# Patient Record
Sex: FEMALE | Race: WHITE | NOT HISPANIC OR LATINO | Employment: UNEMPLOYED | URBAN - METROPOLITAN AREA
[De-identification: names, ages, dates, MRNs, and addresses within clinical notes are randomized per-mention and may not be internally consistent; named-entity substitution may affect disease eponyms.]

---

## 2019-10-14 ENCOUNTER — HOSPITAL ENCOUNTER (EMERGENCY)
Facility: MEDICAL CENTER | Age: 59
End: 2019-10-14
Attending: EMERGENCY MEDICINE
Payer: MEDICAID

## 2019-10-14 VITALS
HEIGHT: 64 IN | BODY MASS INDEX: 35 KG/M2 | HEART RATE: 93 BPM | OXYGEN SATURATION: 94 % | SYSTOLIC BLOOD PRESSURE: 148 MMHG | TEMPERATURE: 98.2 F | RESPIRATION RATE: 18 BRPM | WEIGHT: 205.03 LBS | DIASTOLIC BLOOD PRESSURE: 105 MMHG

## 2019-10-14 DIAGNOSIS — Z76.0 MEDICATION REFILL: ICD-10-CM

## 2019-10-14 PROCEDURE — 99282 EMERGENCY DEPT VISIT SF MDM: CPT

## 2019-10-14 RX ORDER — GABAPENTIN 300 MG/1
300 CAPSULE ORAL 3 TIMES DAILY
Status: SHIPPED | COMMUNITY
End: 2019-10-14 | Stop reason: SDUPTHER

## 2019-10-14 RX ORDER — ALBUTEROL SULFATE 90 UG/1
2 AEROSOL, METERED RESPIRATORY (INHALATION) EVERY 6 HOURS PRN
Qty: 8.5 G | Refills: 1 | Status: SHIPPED | OUTPATIENT
Start: 2019-10-14 | End: 2020-02-16 | Stop reason: SDUPTHER

## 2019-10-14 RX ORDER — AMLODIPINE BESYLATE 5 MG/1
5 TABLET ORAL EVERY MORNING
Status: SHIPPED | COMMUNITY
End: 2019-10-14 | Stop reason: SDUPTHER

## 2019-10-14 RX ORDER — MONTELUKAST SODIUM 10 MG/1
10 TABLET ORAL DAILY
Qty: 30 TAB | Refills: 0 | Status: SHIPPED | OUTPATIENT
Start: 2019-10-14

## 2019-10-14 RX ORDER — OLANZAPINE 15 MG/1
15 TABLET ORAL NIGHTLY
Status: SHIPPED | COMMUNITY
End: 2019-10-14 | Stop reason: SDUPTHER

## 2019-10-14 RX ORDER — LITHIUM CARBONATE 300 MG/1
300 TABLET, FILM COATED, EXTENDED RELEASE ORAL EVERY EVENING
Qty: 30 TAB | Refills: 0 | Status: SHIPPED | OUTPATIENT
Start: 2019-10-14 | End: 2020-03-19

## 2019-10-14 RX ORDER — PANTOPRAZOLE SODIUM 20 MG/1
20 TABLET, DELAYED RELEASE ORAL DAILY
Status: SHIPPED | COMMUNITY
End: 2019-10-14

## 2019-10-14 RX ORDER — HYDROXYZINE 50 MG/1
50 TABLET, FILM COATED ORAL 3 TIMES DAILY
Status: SHIPPED | COMMUNITY
End: 2019-10-14 | Stop reason: SDUPTHER

## 2019-10-14 RX ORDER — VILAZODONE HYDROCHLORIDE 20 MG/1
20 TABLET ORAL EVERY MORNING
Qty: 7 TAB | Refills: 0 | Status: SHIPPED | OUTPATIENT
Start: 2019-10-14 | End: 2021-12-27

## 2019-10-14 RX ORDER — ALBUTEROL SULFATE 90 UG/1
2 AEROSOL, METERED RESPIRATORY (INHALATION) EVERY 6 HOURS PRN
Status: SHIPPED | COMMUNITY
End: 2019-10-14 | Stop reason: SDUPTHER

## 2019-10-14 RX ORDER — OMEPRAZOLE 10 MG/1
10 CAPSULE, DELAYED RELEASE ORAL EVERY MORNING
Qty: 30 CAP | Refills: 0 | Status: SHIPPED | OUTPATIENT
Start: 2019-10-14

## 2019-10-14 RX ORDER — LEVETIRACETAM 500 MG/1
500 TABLET ORAL 2 TIMES DAILY
Status: SHIPPED | COMMUNITY
End: 2019-10-14 | Stop reason: SDUPTHER

## 2019-10-14 RX ORDER — LITHIUM CARBONATE 300 MG/1
300 TABLET, FILM COATED, EXTENDED RELEASE ORAL EVERY EVENING
Status: SHIPPED | COMMUNITY
End: 2019-10-14 | Stop reason: SDUPTHER

## 2019-10-14 RX ORDER — RANITIDINE 150 MG/1
150 TABLET ORAL DAILY
Status: SHIPPED | COMMUNITY
End: 2019-10-14

## 2019-10-14 RX ORDER — HYDROXYZINE 50 MG/1
50 TABLET, FILM COATED ORAL 3 TIMES DAILY
Qty: 90 TAB | Refills: 0 | Status: SHIPPED | OUTPATIENT
Start: 2019-10-14

## 2019-10-14 RX ORDER — OLANZAPINE 15 MG/1
15 TABLET ORAL
Qty: 30 TAB | Refills: 0 | Status: SHIPPED | OUTPATIENT
Start: 2019-10-14 | End: 2021-12-06

## 2019-10-14 RX ORDER — MONTELUKAST SODIUM 10 MG/1
10 TABLET ORAL DAILY
Status: SHIPPED | COMMUNITY
End: 2019-10-14 | Stop reason: SDUPTHER

## 2019-10-14 RX ORDER — VILAZODONE HYDROCHLORIDE 20 MG/1
20 TABLET ORAL EVERY MORNING
Status: SHIPPED | COMMUNITY
End: 2019-10-14 | Stop reason: SDUPTHER

## 2019-10-14 RX ORDER — GABAPENTIN 300 MG/1
300 CAPSULE ORAL 3 TIMES DAILY
Qty: 90 CAP | Refills: 0 | Status: SHIPPED | OUTPATIENT
Start: 2019-10-14

## 2019-10-14 RX ORDER — BETAMETHASONE DIPROPIONATE 0.5 MG/G
1 CREAM TOPICAL 2 TIMES DAILY
Status: SHIPPED | COMMUNITY
End: 2019-10-14 | Stop reason: SDUPTHER

## 2019-10-14 RX ORDER — LEVETIRACETAM 500 MG/1
500 TABLET ORAL 2 TIMES DAILY
Qty: 60 TAB | Refills: 0 | Status: SHIPPED | OUTPATIENT
Start: 2019-10-14 | End: 2020-03-19

## 2019-10-14 RX ORDER — OMEPRAZOLE 10 MG/1
10 CAPSULE, DELAYED RELEASE ORAL EVERY MORNING
Status: SHIPPED | COMMUNITY
End: 2019-10-14 | Stop reason: SDUPTHER

## 2019-10-14 RX ORDER — BETAMETHASONE DIPROPIONATE 0.5 MG/G
1 CREAM TOPICAL 2 TIMES DAILY
Qty: 1 TUBE | Refills: 0 | Status: SHIPPED | OUTPATIENT
Start: 2019-10-14

## 2019-10-14 RX ORDER — AMLODIPINE BESYLATE 5 MG/1
5 TABLET ORAL EVERY MORNING
Qty: 30 TAB | Refills: 0 | Status: SHIPPED | OUTPATIENT
Start: 2019-10-14 | End: 2020-03-19

## 2019-10-14 SDOH — HEALTH STABILITY: MENTAL HEALTH: HOW OFTEN DO YOU HAVE A DRINK CONTAINING ALCOHOL?: NEVER

## 2019-10-14 ASSESSMENT — PAIN SCALES - WONG BAKER: WONGBAKER_NUMERICALRESPONSE: DOESN'T HURT AT ALL

## 2019-10-14 NOTE — ED NOTES
Med Rec complete per Pt, Pt's son and RX packaging at bedside  Allergies Reviewed  No ABX in the last 14 days    Pt has been out of most of her medications for > 1 week.    Pt is taking RANITIDINE, PRILOSEC, and PROTONIX

## 2019-10-14 NOTE — ED NOTES
Patient recently intermediate care is being transferred to son so he brought her to [place] so he can care for her. She was hospitalized for confusion and was started on Lithium which seems to make it worse. She needs her medications until she can establish with new  Doctors.

## 2019-10-14 NOTE — ED TRIAGE NOTES
"Chief Complaint   Patient presents with   • Medication Refill     pt is from [place]. pt has been out of her medications for 2 weeks. pt has an appoinment with estabalished PCP on November 6th. pt has 18 meds to refil.      /105   Pulse 93   Temp 36.8 °C (98.2 °F) (Temporal)   Resp 18   Ht 1.626 m (5' 4\")   Wt 93 kg (205 lb 0.4 oz)   SpO2 94%   BMI 35.19 kg/m²     "

## 2019-10-14 NOTE — ED PROVIDER NOTES
ED Provider Note    CHIEF COMPLAINT  Chief Complaint   Patient presents with   • Medication Refill     pt is from [place]. pt has been out of her medications for 2 weeks. pt has an appoinment with estabalished PCP on November 6th. pt has 18 meds to refil.        HPI  Aubree Cantu is a 59 y.o. female who presents asking for refill on all of her medication.  The patient was taken out of a care home by her son and she is currently living with him in [place].  She just came from [place] and had no way to get all of her prescriptions.  The patient is asking for refill for the next month until she can establish with a primary care provider.  She presents in an asymptomatic state.    REVIEW OF SYSTEMS  See HPI for further details. All other systems are negative.     PAST MEDICAL HISTORY  Past Medical History:   Diagnosis Date   • Asthma    • Cancer (HCC)     skin cancer   • Chronic obstructive pulmonary disease (HCC)    • Hypertension    • Psychiatric disorder     PTSD, Bipolar, depression, anxiety       FAMILY HISTORY  [unfilled]    SOCIAL HISTORY  Social History     Socioeconomic History   • Marital status:      Spouse name: Not on file   • Number of children: Not on file   • Years of education: Not on file   • Highest education level: Not on file   Occupational History   • Not on file   Social Needs   • Financial resource strain: Not on file   • Food insecurity:     Worry: Not on file     Inability: Not on file   • Transportation needs:     Medical: Not on file     Non-medical: Not on file   Tobacco Use   • Smoking status: Never Smoker   • Smokeless tobacco: Never Used   Substance and Sexual Activity   • Alcohol use: Never     Frequency: Never   • Drug use: Never   • Sexual activity: Not on file   Lifestyle   • Physical activity:     Days per week: Not on file     Minutes per session: Not on file   • Stress: Not on file   Relationships   • Social connections:     Talks on phone: Not on file     Gets together: Not  "on file     Attends [employer] service: Not on file     Active member of club or organization: Not on file     Attends meetings of clubs or organizations: Not on file     Relationship status: Not on file   • Intimate partner violence:     Fear of current or ex partner: Not on file     Emotionally abused: Not on file     Physically abused: Not on file     Forced sexual activity: Not on file   Other Topics Concern   • Not on file   Social History Narrative   • Not on file       SURGICAL HISTORY  Past Surgical History:   Procedure Laterality Date   • OTHER      Parathyroidectomy       CURRENT MEDICATIONS  Home Medications     Reviewed by Charles Miller (Pharmacy Tech) on 10/14/19 at 1345  Med List Status: Complete   Medication Last Dose Status   albuterol 108 (90 Base) MCG/ACT Aero Soln inhalation aerosol > 1 week Active   amLODIPine (NORVASC) 5 MG Tab > 1 week Active   betamethasone dipropionate (DIPROLENE) 0.05 % Cream 10/14/2019 Active   gabapentin (NEURONTIN) 300 MG Cap 10/14/2019 Active   hydrOXYzine HCl (ATARAX) 50 MG Tab > 1 week Active   levETIRAcetam (KEPPRA) 500 MG Tab > 1 week Active   lithium CR (LITHOBID) 300 MG Tab CR > 1 week Active   montelukast (SINGULAIR) 10 MG Tab > 1 week Active   olanzapine (ZYPREXA) 15 MG tablet > 1 week Active   omeprazole (PRILOSEC) 10 MG CAPSULE DELAYED RELEASE 10/14/2019 Active   pantoprazole (PROTONIX) 20 MG tablet > 1 week Active   raNITidine (ZANTAC) 150 MG Tab > 1 week Active   Vilazodone HCl (VIIBRYD) 20 MG Tab > 1 week Active                ALLERGIES  Allergies   Allergen Reactions   • Xanax [Alprazolam Xr]      Increased anxiety       PHYSICAL EXAM  VITAL SIGNS: /105   Pulse 93   Temp 36.8 °C (98.2 °F) (Temporal)   Resp 18   Ht 1.626 m (5' 4\")   Wt 93 kg (205 lb 0.4 oz)   SpO2 94%   BMI 35.19 kg/m²       Constitutional: Well developed, Well nourished, No acute distress, Non-toxic appearance.   HENT: Normocephalic, Atraumatic, Bilateral external ears " normal, Oropharynx moist, No oral exudates, Nose normal.   Eyes: PERRLA, EOMI, Conjunctiva normal, No discharge.   Neck: Normal range of motion, No tenderness, Supple, No stridor.   Lymphatic: No lymphadenopathy noted.   Cardiovascular: Normal heart rate, Normal rhythm, No murmurs, No rubs, No gallops.   Thorax & Lungs: Normal breath sounds, No respiratory distress, No wheezing, No chest tenderness.   Abdomen: Bowel sounds normal, Soft, No tenderness, No masses, No pulsatile masses.   Skin: Warm, Dry, No erythema, No rash.   Back: No tenderness, No CVA tenderness.   Extremities: Intact distal pulses, No edema, No tenderness, No cyanosis, No clubbing.   Musculoskeletal: Good range of motion in all major joints. No tenderness to palpation or major deformities noted.   Neurologic: Alert & oriented x 3, Normal motor function, Normal sensory function, No focal deficits noted.   Psychiatric: Affect normal, Judgment normal, Mood normal.     COURSE & MEDICAL DECISION MAKING  Pertinent Labs & Imaging studies reviewed. (See chart for details)  This is a 59-year-old female who presents the emerge department in an asymptomatic state asking for a refill on her medication.  This will be accomplished for the next month.  She will follow-up with her primary care provider and return for any symptoms.    FINAL IMPRESSION  1.  Medication refill         Electronically signed by: Edison Telles, 10/14/2019 1:59 PM

## 2020-02-16 ENCOUNTER — APPOINTMENT (OUTPATIENT)
Dept: RADIOLOGY | Facility: MEDICAL CENTER | Age: 60
End: 2020-02-16
Attending: EMERGENCY MEDICINE
Payer: MEDICAID

## 2020-02-16 ENCOUNTER — HOSPITAL ENCOUNTER (EMERGENCY)
Facility: MEDICAL CENTER | Age: 60
End: 2020-02-16
Attending: EMERGENCY MEDICINE
Payer: MEDICAID

## 2020-02-16 VITALS
DIASTOLIC BLOOD PRESSURE: 65 MMHG | OXYGEN SATURATION: 93 % | WEIGHT: 196.21 LBS | HEIGHT: 64 IN | SYSTOLIC BLOOD PRESSURE: 124 MMHG | RESPIRATION RATE: 18 BRPM | HEART RATE: 75 BPM | BODY MASS INDEX: 33.5 KG/M2 | TEMPERATURE: 97.2 F

## 2020-02-16 DIAGNOSIS — R05.9 COUGH: ICD-10-CM

## 2020-02-16 PROCEDURE — 99284 EMERGENCY DEPT VISIT MOD MDM: CPT

## 2020-02-16 PROCEDURE — A9270 NON-COVERED ITEM OR SERVICE: HCPCS | Performed by: EMERGENCY MEDICINE

## 2020-02-16 PROCEDURE — 700111 HCHG RX REV CODE 636 W/ 250 OVERRIDE (IP): Performed by: EMERGENCY MEDICINE

## 2020-02-16 PROCEDURE — 71046 X-RAY EXAM CHEST 2 VIEWS: CPT

## 2020-02-16 PROCEDURE — 700102 HCHG RX REV CODE 250 W/ 637 OVERRIDE(OP): Performed by: EMERGENCY MEDICINE

## 2020-02-16 RX ORDER — BENZONATATE 100 MG/1
100 CAPSULE ORAL 3 TIMES DAILY PRN
Qty: 20 CAP | Refills: 0 | Status: SHIPPED | OUTPATIENT
Start: 2020-02-16

## 2020-02-16 RX ORDER — BENZONATATE 100 MG/1
100 CAPSULE ORAL ONCE
Status: COMPLETED | OUTPATIENT
Start: 2020-02-16 | End: 2020-02-16

## 2020-02-16 RX ORDER — ALBUTEROL SULFATE 90 UG/1
2 AEROSOL, METERED RESPIRATORY (INHALATION) EVERY 6 HOURS PRN
Qty: 8.5 G | Refills: 1 | Status: SHIPPED | OUTPATIENT
Start: 2020-02-16

## 2020-02-16 RX ORDER — PREDNISONE 20 MG/1
20 TABLET ORAL DAILY
Qty: 4 TAB | Refills: 0 | Status: SHIPPED | OUTPATIENT
Start: 2020-02-16 | End: 2020-02-20

## 2020-02-16 RX ORDER — PREDNISONE 10 MG/1
20 TABLET ORAL ONCE
Status: COMPLETED | OUTPATIENT
Start: 2020-02-16 | End: 2020-02-16

## 2020-02-16 RX ADMIN — BENZONATATE 100 MG: 100 CAPSULE ORAL at 13:26

## 2020-02-16 RX ADMIN — PREDNISONE 20 MG: 10 TABLET ORAL at 13:25

## 2020-02-16 NOTE — ED TRIAGE NOTES
"Presents complaining of a productive cough, congestion and chills recurring for the past 3 to 4 days with green phlegm production.   Chief Complaint   Patient presents with   • Cough     /77   Pulse 74   Temp 36.2 °C (97.2 °F) (Temporal)   Resp 16   Ht 1.626 m (5' 4\")   Wt 89 kg (196 lb 3.4 oz)   SpO2 93%   BMI 33.68 kg/m²     "

## 2020-02-16 NOTE — DISCHARGE INSTRUCTIONS
Please follow-up with your primary care physician for complete recheck.  Return to the emergency department if you develop any new or worsening symptoms, this includes worsening cough, congestion, shortness of breath, or any further concerns.  If your symptoms have not improved in 2 to 3 days, please return to the emergency department if you are unable to follow-up with primary care for breathing recheck.

## 2020-02-16 NOTE — ED PROVIDER NOTES
"ED Provider Note    Chief Complaint:   Cough    HPI:  Aubree Cantu is a 60 y.o. female who presents with chief complaint of cough for 1 week.  She reports a dry persistent cough, neither worsening nor improving.  She attended a family event last week and developed a cough at that time.  She presents to the emergency department accompanied by her son and daughter-in-law who have similar symptoms.  She denies any associated fevers, she has had no nausea, no vomiting, no sore throat.  She has no chest pain.  She denies any associated shortness of breath except when actively coughing.    She does have a history of COPD, as well as a history of asthma.  She has been using her albuterol rescue inhaler little more frequently than usual, and is requesting refill today.    Review of Systems:  See HPI for pertinent positives and negatives. All other systems negative.    Past Medical History:   has a past medical history of Asthma, Cancer (HCC), Chronic obstructive pulmonary disease (HCC), Hypertension, and Psychiatric disorder.    Social History:  Social History     Tobacco Use   • Smoking status: Never Smoker   • Smokeless tobacco: Never Used   Substance and Sexual Activity   • Alcohol use: Never     Frequency: Never   • Drug use: Never   • Sexual activity: Not on file       Surgical History:   has a past surgical history that includes other.    Current Medications:  Home Medications    **Home medications have not yet been reviewed for this encounter**         Allergies:  Allergies   Allergen Reactions   • Xanax [Alprazolam Xr]      Increased anxiety       Physical Exam:  Vital Signs: /65   Pulse 75   Temp 36.2 °C (97.2 °F) (Temporal)   Resp 18   Ht 1.626 m (5' 4\")   Wt 89 kg (196 lb 3.4 oz)   SpO2 93%   BMI 33.68 kg/m²   Constitutional: Alert, no acute distress  HENT: Moist mucus membranes, normal posterior pharynx, no intraoral lesions  Eyes: Normal conjunctiva  Neck: Supple, normal range of " motion  Cardiovascular: Extremities are warm and well perfused, no murmur appreciated, normal cardiac auscultation  Pulmonary: No respiratory distress, normal work of breathing, no accessory muscule usage, breath sounds clear and equal bilaterally, no wheezing, no coarse breath sounds  Psychiatric: Normal and appropriate mood and affect    Medical records reviewed for continuity of care.  No recent visits for similar symptoms.  Patient had been taken out of a care facility by her son in October 2019, was brought to the emergency department for all medication refills.  She does have a history of COPD and asthma.    MDM:  Patient presents with dry intermittent cough for the past week.  She has a negative chest x-ray.  Due to her history of COPD and asthma as well as increased inhaler use I did give her a short course of prednisone.  She has no wheezing and no hypoxia on my exam, do not believe she requires emergent nebulizer treatment.  I did refill her inhaler at her request as well.  She has no symptoms of influenza at this time, she came to the emergency department with her son and daughter-in-law both of whom tested positive for influenza.  Suspect this may be residual cough that she recovers from a mild case of flu.  If so, she is outside of the window for Tamiflu treatment.  Counseled her on supportive care.  She is discharged home in stable condition.  Counseled to follow-up with her primary care physician within 1 week for breathing recheck.  Return precautions were discussed with the patient, and provided in written form with the patient's discharge instructions.     Blood pressure today is greater than 120/80, patient is instructed to follow up with primary care provider for blood pressure recheck.    Disposition:  Discharge home in stable condition    Final Impression:  1. Cough        Electronically signed by: Keisha Varela MD, 2/16/2020 7:03 PM

## 2020-02-16 NOTE — ED NOTES
D/c pt home, 3 rx given . Pt aware of f/u instructions , aware to return for any changes or concerns. No further questions upon d/c home from ed

## 2020-03-03 ENCOUNTER — HOSPITAL ENCOUNTER (EMERGENCY)
Facility: MEDICAL CENTER | Age: 60
End: 2020-03-03
Attending: EMERGENCY MEDICINE
Payer: MEDICAID

## 2020-03-03 ENCOUNTER — APPOINTMENT (OUTPATIENT)
Dept: RADIOLOGY | Facility: MEDICAL CENTER | Age: 60
End: 2020-03-03
Attending: EMERGENCY MEDICINE
Payer: MEDICAID

## 2020-03-03 VITALS
TEMPERATURE: 98.6 F | OXYGEN SATURATION: 95 % | HEIGHT: 64 IN | DIASTOLIC BLOOD PRESSURE: 90 MMHG | SYSTOLIC BLOOD PRESSURE: 120 MMHG | RESPIRATION RATE: 16 BRPM | HEART RATE: 90 BPM | BODY MASS INDEX: 33.8 KG/M2 | WEIGHT: 197.97 LBS

## 2020-03-03 DIAGNOSIS — S39.012A STRAIN OF LUMBAR REGION, INITIAL ENCOUNTER: ICD-10-CM

## 2020-03-03 DIAGNOSIS — S93.401A SPRAIN OF RIGHT ANKLE, UNSPECIFIED LIGAMENT, INITIAL ENCOUNTER: ICD-10-CM

## 2020-03-03 DIAGNOSIS — S86.012A STRAIN OF LEFT ACHILLES TENDON, INITIAL ENCOUNTER: ICD-10-CM

## 2020-03-03 DIAGNOSIS — G44.319 ACUTE POST-TRAUMATIC HEADACHE, NOT INTRACTABLE: ICD-10-CM
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
## 2020-03-03 PROCEDURE — 70450 CT HEAD/BRAIN W/O DYE: CPT

## 2020-03-03 PROCEDURE — 99284 EMERGENCY DEPT VISIT MOD MDM: CPT

## 2020-03-03 PROCEDURE — 700102 HCHG RX REV CODE 250 W/ 637 OVERRIDE(OP): Performed by: EMERGENCY MEDICINE

## 2020-03-03 PROCEDURE — A9270 NON-COVERED ITEM OR SERVICE: HCPCS | Performed by: EMERGENCY MEDICINE

## 2020-03-03 RX ORDER — IBUPROFEN 600 MG/1
600 TABLET ORAL EVERY 6 HOURS PRN
Qty: 20 TAB | Refills: 0 | Status: SHIPPED | OUTPATIENT
Start: 2020-03-03

## 2020-03-03 RX ORDER — CLONAZEPAM 1 MG/1
1 TABLET ORAL ONCE
Status: COMPLETED | OUTPATIENT
Start: 2020-03-03 | End: 2020-03-03

## 2020-03-03 RX ADMIN — CLONAZEPAM 1 MG: 1 TABLET ORAL at 11:23

## 2020-03-03 ASSESSMENT — LIFESTYLE VARIABLES: DO YOU DRINK ALCOHOL: NO

## 2020-03-03 NOTE — ED PROVIDER NOTES
ED Provider Note    CHIEF COMPLAINT  Chief Complaint   Patient presents with   • T-5000 GLF   • Low Back Pain   • Foot Pain       HPI  Aubree Cantu is a 60 y.o. female who presents complaining of progressive headache, midline radiating all sides of her head suffered after fall striking her head on the concrete yesterday.  She states she was not paying attention and tripped over a curb.  She has had some soreness to both ankles, left forearm, left lower back.  No abdominal pain, no chest pain, no shortness of breath.  She denies taking blood thinners.  No loss of consciousness.  No acute numbness or weakness.  Patient states she has fibromyalgia in remission.  She states she has been anxious since her sister was murdered with a hammer last month.  No suicidal or homicidal ideation    REVIEW OF SYSTEMS  Ear nose throat: No facial pain  Respiratory: No shortness of breath or pleurisy  Gastrointestinal: No nausea or vomiting  Musculoskeletal: Bilateral ankle, left wrist, low back pain.  Patient denies neck pain  Neurologic: Headache  Skin: No laceration     All other systems are negative.       PAST MEDICAL HISTORY  Past Medical History:   Diagnosis Date   • Asthma    • Cancer (HCC)     skin cancer   • Chronic obstructive pulmonary disease (HCC)    • Hypertension    • Psychiatric disorder     PTSD, Bipolar, depression, anxiety       FAMILY HISTORY  History reviewed. No pertinent family history.    SOCIAL HISTORY  Social History     Socioeconomic History   • Marital status:      Spouse name: Not on file   • Number of children: Not on file   • Years of education: Not on file   • Highest education level: Not on file   Occupational History   • Not on file   Social Needs   • Financial resource strain: Not on file   • Food insecurity     Worry: Not on file     Inability: Not on file   • Transportation needs     Medical: Not on file     Non-medical: Not on file   Tobacco Use   • Smoking status: Never Smoker   •  Smokeless tobacco: Never Used   Substance and Sexual Activity   • Alcohol use: Never     Frequency: Never   • Drug use: Never   • Sexual activity: Not on file   Lifestyle   • Physical activity     Days per week: Not on file     Minutes per session: Not on file   • Stress: Not on file   Relationships   • Social connections     Talks on phone: Not on file     Gets together: Not on file     Attends [employer] service: Not on file     Active member of club or organization: Not on file     Attends meetings of clubs or organizations: Not on file     Relationship status: Not on file   • Intimate partner violence     Fear of current or ex partner: Not on file     Emotionally abused: Not on file     Physically abused: Not on file     Forced sexual activity: Not on file   Other Topics Concern   • Not on file   Social History Narrative   • Not on file       SURGICAL HISTORY  Past Surgical History:   Procedure Laterality Date   • OTHER      Parathyroidectomy       CURRENT MEDICATIONS  No current facility-administered medications on file prior to encounter.      Current Outpatient Medications on File Prior to Encounter   Medication Sig Dispense Refill   • albuterol 108 (90 Base) MCG/ACT Aero Soln inhalation aerosol Inhale 2 Puffs by mouth every 6 hours as needed for Shortness of Breath. 8.5 g 1   • benzonatate (TESSALON) 100 MG Cap Take 1 Cap by mouth 3 times a day as needed for Cough. 20 Cap 0   • amLODIPine (NORVASC) 5 MG Tab Take 1 Tab by mouth every morning. 30 Tab 0   • betamethasone dipropionate (DIPROLENE) 0.05 % Cream Apply 1 Application to affected area(s) 2 times a day. Apply to legs and feet 1 Tube 0   • gabapentin (NEURONTIN) 300 MG Cap Take 1 Cap by mouth 3 times a day. 90 Cap 0   • hydrOXYzine HCl (ATARAX) 50 MG Tab Take 1 Tab by mouth 3 times a day. Indications: Feeling Anxious 90 Tab 0   • levETIRAcetam (KEPPRA) 500 MG Tab Take 1 Tab by mouth 2 times a day. 60 Tab 0   • lithium CR (LITHOBID) 300 MG Tab CR Take 1  "Tab by mouth every evening. 30 Tab 0   • montelukast (SINGULAIR) 10 MG Tab Take 1 Tab by mouth every day. 30 Tab 0   • olanzapine (ZYPREXA) 15 MG tablet Take 1 Tab by mouth every bedtime. 30 Tab 0   • omeprazole (PRILOSEC) 10 MG CAPSULE DELAYED RELEASE Take 1 Cap by mouth every morning. 30 Cap 0   • Vilazodone HCl (VIIBRYD) 20 MG Tab Take 20 mg by mouth every morning. 7 Tab 0       ALLERGIES  Allergies   Allergen Reactions   • Xanax [Alprazolam Xr]      Increased anxiety       PHYSICAL EXAM  VITAL SIGNS: /101   Pulse 79   Temp 37 °C (98.6 °F) (Temporal)   Resp 16   Ht 1.626 m (5' 4\")   Wt 89.8 kg (197 lb 15.6 oz)   SpO2 96%   BMI 33.98 kg/m²    Constitutional: Well-nourished, no distress  HENT: No facial trauma, facial bones are nontender  Eyes: Pupils are equal 3 millimeters, Conjunctiva normal, No discharge.   Neck: Nontender, range of motion without pain or stiffness  Cardiovascular: Normal heart rate, Normal rhythm   Pulmonary: Equal  breath sounds, No wheezing or rales.  Normal air movement  GI: Abdomen is soft and nontender, no guarding  Skin: No abrasion, no bruising, no laceration  Vascular: Normal capillary refill all extremities  Musculoskeletal: Midline spine is nontender.  There is left paraspinal musculature tenderness in the lumbar area.  The pelvis is nontender.  Lateral malleolus of the right ankle is tender without deformity or swelling.  No crepitance.  Left ankle nontender, there is mild tenderness at the distal Achilles tendon.  Musculature of the left forearm is tender to the dorsal aspect, no arthralgia in the area.  No bony tenderness  Neurologic: Sensation and strength normal.  Speech clear  Psychiatric: Some anxiety    RADIOLOGY/PROCEDURES  CT-HEAD W/O   Final Result      1.  Cerebral atrophy.      2.  Otherwise, Head CT without contrast within normal limits. No evidence of acute cerebral infarction, hemorrhage or mass lesion.                COURSE & MEDICAL DECISION " MAKING  Pertinent Labs & Imaging studies reviewed. (See chart for details)  Patient requesting prescription for Clonopin, medication she is taken in the past but is currently not on.  Patient cites anxiety over her sister's recent murder.  I am agreeable to giving her dose in the ER however she will need consultation with her primary doctor or psychiatrist prior to prescription and has been advised to follow-up with these 2 doctors of hers.  Patient does not require x-rays of extremity injuries at this time, appear to be muscular strain or local contusion as opposed to fracture.  She is advised she will need reevaluation of these areas in 2 weeks if not better.  CT scan of the head is obtained secondary to progressive headache the day after striking her head on concrete.      Head CT scan negative, no evidence of skull fracture or bleed.  Patient has agreed to follow-up with  in 1 to 2 weeks if other areas of injury have not improved.  She is discharged stable condition    FINAL IMPRESSION     1. Fall, initial encounter     2. Strain of lumbar region, initial encounter     3. Sprain of right ankle, unspecified ligament, initial encounter     4. Strain of left Achilles tendon, initial encounter     5. Acute post-traumatic headache, not intractable               Electronically signed by: Phil Cummings M.D., 3/3/2020

## 2020-03-07 ENCOUNTER — APPOINTMENT (OUTPATIENT)
Dept: RADIOLOGY | Facility: MEDICAL CENTER | Age: 60
End: 2020-03-07
Attending: EMERGENCY MEDICINE
Payer: MEDICAID

## 2020-03-07 ENCOUNTER — HOSPITAL ENCOUNTER (EMERGENCY)
Facility: MEDICAL CENTER | Age: 60
End: 2020-03-07
Attending: EMERGENCY MEDICINE
Payer: MEDICAID

## 2020-03-07 VITALS
WEIGHT: 204.15 LBS | HEIGHT: 65 IN | TEMPERATURE: 96.6 F | DIASTOLIC BLOOD PRESSURE: 89 MMHG | RESPIRATION RATE: 14 BRPM | HEART RATE: 70 BPM | BODY MASS INDEX: 34.01 KG/M2 | OXYGEN SATURATION: 96 % | SYSTOLIC BLOOD PRESSURE: 148 MMHG

## 2020-03-07 DIAGNOSIS — M25.572 ACUTE BILATERAL ANKLE PAIN: ICD-10-CM

## 2020-03-07 DIAGNOSIS — M54.6 ACUTE BILATERAL THORACIC BACK PAIN: ICD-10-CM

## 2020-03-07 DIAGNOSIS — M25.571 ACUTE BILATERAL ANKLE PAIN: ICD-10-CM

## 2020-03-07 PROCEDURE — 73610 X-RAY EXAM OF ANKLE: CPT | Mod: RT

## 2020-03-07 PROCEDURE — 73610 X-RAY EXAM OF ANKLE: CPT | Mod: LT

## 2020-03-07 PROCEDURE — 72070 X-RAY EXAM THORAC SPINE 2VWS: CPT

## 2020-03-07 PROCEDURE — 99283 EMERGENCY DEPT VISIT LOW MDM: CPT

## 2020-03-07 ASSESSMENT — LIFESTYLE VARIABLES
DOES PATIENT WANT TO STOP DRINKING: NO
DO YOU DRINK ALCOHOL: NO

## 2020-03-07 NOTE — ED NOTES
RN educated Pt on pain control and return precautions. Pt verbalized understanding. Ambulated out of ED independently.

## 2020-03-07 NOTE — ED PROVIDER NOTES
"ED Provider Note    CHIEF COMPLAINT  Chief Complaint   Patient presents with   • Ankle Injury     Patient states \"a little over a week ago\" she stumbled over a curb and hurt Bilateral ankles, no trauma noted, bilateral CMS intact, denies numbness and tingling   • Back Pain     Started after \"stumble\" she has upper and lower back pain. able to void and pass stool normally.        HPI  Aubree Cantu is a 60 y.o. female who ambulates to the emergency department through triage for ankle pain, back pain.  Patient states symptomatology stems from a mechanical ground-level trip and fall over a curb last week.  Patient was seen here initially, but states she did not have these injuries evaluated at that time.  She describes some mild intermittent pain in bilateral ankles, and her mid back.  No paresthesias.  No focal weakness.  No bowel or bladder changes.  No lower extremity swelling, discoloration.  Patient ambulates without difficulty.    REVIEW OF SYSTEMS  See HPI for further details. All other systems are negative.     PAST MEDICAL HISTORY   has a past medical history of Asthma, Cancer (HCC), Chronic obstructive pulmonary disease (HCC), Hypertension, and Psychiatric disorder.    SOCIAL HISTORY  Social History     Tobacco Use   • Smoking status: Never Smoker   • Smokeless tobacco: Never Used   Substance and Sexual Activity   • Alcohol use: Never     Frequency: Never   • Drug use: Never   • Sexual activity: Not on file       SURGICAL HISTORY   has a past surgical history that includes other.    CURRENT MEDICATIONS  Home Medications     Reviewed by Phil Bhagat R.N. (Registered Nurse) on 03/07/20 at 0439  Med List Status: <None>   Medication Last Dose Status   albuterol 108 (90 Base) MCG/ACT Aero Soln inhalation aerosol  Active   amLODIPine (NORVASC) 5 MG Tab  Active   benzonatate (TESSALON) 100 MG Cap  Active   betamethasone dipropionate (DIPROLENE) 0.05 % Cream  Active   gabapentin (NEURONTIN) " "300 MG Cap  Active   hydrOXYzine HCl (ATARAX) 50 MG Tab  Active   ibuprofen (MOTRIN) 600 MG Tab  Active   levETIRAcetam (KEPPRA) 500 MG Tab  Active   lithium CR (LITHOBID) 300 MG Tab CR  Active   montelukast (SINGULAIR) 10 MG Tab  Active   olanzapine (ZYPREXA) 15 MG tablet  Active   omeprazole (PRILOSEC) 10 MG CAPSULE DELAYED RELEASE  Active   Vilazodone HCl (VIIBRYD) 20 MG Tab  Active                ALLERGIES  Allergies   Allergen Reactions   • Xanax [Alprazolam Xr]      Increased anxiety       PHYSICAL EXAM  VITAL SIGNS: /89   Pulse 70   Temp 35.9 °C (96.6 °F) (Temporal)   Resp 14   Ht 1.638 m (5' 4.5\")   Wt 92.6 kg (204 lb 2.3 oz)   LMP  (LMP Unknown)   SpO2 96%   BMI 34.50 kg/m²   Pulse ox interpretation: I interpret this pulse ox as normal.  Constitutional: Alert in no apparent distress.  HENT: Normocephalic, atraumatic. Bilateral external ears normal, Nose normal. Moist mucous membranes.    Eyes: Pupils are equal and reactive, Conjunctiva normal.   Neck: Normal range of motion  Cardiovascular: Normal peripheral perfusion.  Thorax & Lungs: Nonlabored respirations.  Skin: Warm, Dry  Musculoskeletal: Bilateral knees, lower legs and ankles appear quite unremarkable, no swelling, discoloration, crepitus.  Patient describes some mild discomfort with palpation bilateral lateral and medial malleoli.  Range of motion appears intact otherwise.  2+ DP, sensation intact light touch distally.  Patient bears weight and ambulates briskly without difficulty.  Pelvis stable.  She does also have some diffuse thoracic midline and paravertebral pain without cutaneous changes, step-off, abrasion or hematoma.  Neurologic: Alert and oriented x4.  Moves 4 extremity spontaneously.  Psychiatric: Odd affect.  Cooperative.      DIAGNOSTIC STUDIES / PROCEDURES  RADIOLOGY  DX-ANKLE 3+ VIEWS LEFT   Final Result      No evidence of acute fracture or dislocation.      DX-ANKLE 3+ VIEWS RIGHT   Final Result      No evidence of " acute fracture or dislocation.      DX-THORACIC SPINE-2 VIEWS   Final Result      No evidence of fracture.          COURSE & MEDICAL DECISION MAKING  Medical record review: Evaluation after mechanical ground-level fall on 3/3/2020, CT head within normal limits.  Extremity discomfort thought to be muscular strain or local contusion, no evidence for fracture.  Reevaluation recommended if not better.    ED evaluation for thoracic back pain, bilateral ankle pain is unrevealing, suspect strain versus local contusion.  Patient is neurologically intact and nonfocal.  She bears weight and ambulates independently without any apparent discomfort.  X-rays are unremarkable.    Patient is stable for discharge at this time, anticipatory guidance provided, Tylenol or Motrin for discomfort, close follow-up is encouraged, and strict ED return instructions have been detailed. Patient is agreeable to the disposition and plan.    Patient's blood pressure was elevated in the emergency department, and has been referred to primary care for close monitoring.      FINAL IMPRESSION  (M25.571,  M25.572) Acute bilateral ankle pain  (M54.6) Acute bilateral thoracic back pain      Electronically signed by: Madelin Horton D.O., 3/7/2020 8:57 AM      This dictation was created using voice recognition software. The accuracy of the dictation is limited to the abilities of the software. I expect there may be some errors of grammar and possibly content. The nursing notes were reviewed and certain aspects of this information were incorporated into this note.

## 2020-03-07 NOTE — ED NOTES
Patient ambulated from the waiting room steady gate and without assistance. Patient states that she just wants to make sure everything is okay from her fall a couple of days ago.

## 2020-03-07 NOTE — DISCHARGE INSTRUCTIONS
Follow-up with primary care next week for reevaluation,, to establish care, medication management, close blood pressure monitoring.    Continue any home medications as previously indicated.    Tylenol or ibuprofen as needed for discomfort.    Activity as tolerated.    Return to the emergency department for persistent worsening pain, swelling, discoloration, paresthesias or other new concerns.

## 2020-03-19 ENCOUNTER — HOSPITAL ENCOUNTER (EMERGENCY)
Facility: MEDICAL CENTER | Age: 60
End: 2020-03-19
Attending: EMERGENCY MEDICINE
Payer: MEDICAID

## 2020-03-19 ENCOUNTER — APPOINTMENT (OUTPATIENT)
Dept: RADIOLOGY | Facility: MEDICAL CENTER | Age: 60
End: 2020-03-19
Attending: EMERGENCY MEDICINE
Payer: MEDICAID

## 2020-03-19 VITALS
HEART RATE: 89 BPM | RESPIRATION RATE: 18 BRPM | HEIGHT: 64 IN | OXYGEN SATURATION: 97 % | SYSTOLIC BLOOD PRESSURE: 186 MMHG | BODY MASS INDEX: 33.12 KG/M2 | TEMPERATURE: 97.6 F | DIASTOLIC BLOOD PRESSURE: 104 MMHG | WEIGHT: 194 LBS

## 2020-03-19 DIAGNOSIS — S06.9X1A MILD TRAUMATIC BRAIN INJURY, WITH LOSS OF CONSCIOUSNESS OF 30 MINUTES OR LESS, INITIAL ENCOUNTER (HCC): ICD-10-CM

## 2020-03-19 DIAGNOSIS — S93.402A SPRAIN OF LEFT ANKLE, UNSPECIFIED LIGAMENT, INITIAL ENCOUNTER: ICD-10-CM

## 2020-03-19 DIAGNOSIS — M54.2 NECK PAIN: ICD-10-CM

## 2020-03-19 DIAGNOSIS — M54.50 ACUTE MIDLINE LOW BACK PAIN WITHOUT SCIATICA: ICD-10-CM

## 2020-03-19 PROCEDURE — 72070 X-RAY EXAM THORAC SPINE 2VWS: CPT

## 2020-03-19 PROCEDURE — 72100 X-RAY EXAM L-S SPINE 2/3 VWS: CPT

## 2020-03-19 PROCEDURE — 99284 EMERGENCY DEPT VISIT MOD MDM: CPT

## 2020-03-19 PROCEDURE — 72040 X-RAY EXAM NECK SPINE 2-3 VW: CPT

## 2020-03-19 PROCEDURE — 73610 X-RAY EXAM OF ANKLE: CPT | Mod: LT

## 2020-03-19 NOTE — ED NOTES
Discharge instructions reviewed with patient and signed. She verbalized understanding of her follow up instructions. She has all her belongings and ambulates with a steady gait

## 2020-03-19 NOTE — ED NOTES
Patient states that 2 days ago she fell when she was carrying suitcases up stairs. She is now having neck, back and left ankle pain. She is ambulatory with a steady gait, denies numbness/tingling. She has taken ibuprofen at home for pain, which has worked for her

## 2020-03-19 NOTE — ED PROVIDER NOTES
ED Provider Note    Scribed for Ronny Funk M.D. by Tevin Carrion. 3/19/2020  9:53 AM    Primary care provider: Pcp Not In Computer  Means of arrival: walk in  History obtained from: patient  History limited by: none    CHIEF COMPLAINT  Chief Complaint   Patient presents with   • Neck Pain   • Back Pain   • Ankle Pain   • T-5000 FALL       HPI  Aubree Cantu is a 60 y.o. female who presents to the Emergency Department complaining of gradually worsening neck and back pain status post ground level fall sustained 2 romero ago. She describes her pain as 9/10 severity. Patient reports associated left ankle pain and loss of consciousness. She states that she was walking up stairs carrying suitcases when she sustained a mechanical ground level fall, impacting her head on her hand which was lying on the concrete and losing consciousness. Patient specifies that she placed her hand in between her head and the concrete prior to impact, and diverting direct contact between the surface and her skull.Patient states that since the event, she has had increasing pain and difficulty ambulating, so she presents to the ED for evaluation and pain control. She reports a history of COPD and was vaccinated with influenza vaccination yesterday. Patient denies nausea, vomiting, focal weakness, cough, shortness of breath, respiratory symptoms, recent travel. .      REVIEW OF SYSTEMS  Pertinent positives include: back pain, neck pain, ankle pain,loss of consciousness.  Pertinent negatives include: nausea, vomiting, focal weakness, cough, shortness of breath.  Headache, confusion, forgetfulness  10+ systems reviewed and negative.      PAST MEDICAL HISTORY  Past Medical History:   Diagnosis Date   • Asthma    • Cancer (HCC)     skin cancer   • Chronic obstructive pulmonary disease (HCC)    • Hypertension    • Psychiatric disorder     PTSD, Bipolar, depression, anxiety       FAMILY HISTORY  None noted    SOCIAL HISTORY  Social History  "    Tobacco Use   • Smoking status: Never Smoker   • Smokeless tobacco: Never Used   Substance Use Topics   • Alcohol use: Never     Frequency: Never   • Drug use: Never     Social History     Substance and Sexual Activity   Drug Use Never       SURGICAL HISTORY  Past Surgical History:   Procedure Laterality Date   • OTHER      Parathyroidectomy       CURRENT MEDICATIONS  Home Medications     Reviewed by Karen Gordon R.N. (Registered Nurse) on 03/19/20 at 0928  Med List Status: <None>   Medication Last Dose Status   albuterol 108 (90 Base) MCG/ACT Aero Soln inhalation aerosol  Active   amLODIPine (NORVASC) 5 MG Tab  Active   benzonatate (TESSALON) 100 MG Cap  Active   betamethasone dipropionate (DIPROLENE) 0.05 % Cream  Active   gabapentin (NEURONTIN) 300 MG Cap  Active   hydrOXYzine HCl (ATARAX) 50 MG Tab  Active   ibuprofen (MOTRIN) 600 MG Tab  Active   levETIRAcetam (KEPPRA) 500 MG Tab  Active   lithium CR (LITHOBID) 300 MG Tab CR  Active   montelukast (SINGULAIR) 10 MG Tab  Active   olanzapine (ZYPREXA) 15 MG tablet  Active   omeprazole (PRILOSEC) 10 MG CAPSULE DELAYED RELEASE  Active   Vilazodone HCl (VIIBRYD) 20 MG Tab  Active                ALLERGIES  Allergies   Allergen Reactions   • Xanax [Alprazolam Xr]      Increased anxiety       PHYSICAL EXAM  VITAL SIGNS: BP (!) 186/104   Pulse 89   Temp 36.4 °C (97.6 °F) (Temporal)   Resp 18   Ht 1.626 m (5' 4\")   Wt 88 kg (194 lb 0.1 oz)   LMP  (LMP Unknown)   SpO2 97%   BMI 33.30 kg/m²   Reviewed and hypertensive  Constitutional: Well developed, Well nourished, Ambulating and able to bear weight with cane.  Hard of hearing  HENT: Normocephalic, atraumatic, bilateral external ears normal, oropharynx moist, No exudates or erythema.   Eyes: PERRLA, conjunctiva pink, no scleral icterus.   Cardiovascular: Regular rate and rhythm. No murmurs, rubs or gallops.   Respiratory: Lungs clear to auscultation bilaterally. No wheezes, rales, or " rhonchi.  Abdominal:  Abdomen soft, non-tender, non distended. No rebound, or guarding.    Skin: No erythema, no rash.   Genitourinary: No costovertebral angle tenderness.   Musculoskeletal: Mid lumbar tenderness. No deformities. Good gait.   Neurologic: GCS 15.  Oriented to person place time and events.  Wrist extension, flexion, finger abduction, and thumb opposition, biceps, triceps and shoulder abduction are 5/5 bilaterally.   Extensor hallucis longus and ankle plantar flexion are symmetric. Sensation is intact on the pad of the first and fifth finger, over the first dorsal web space of the hand, And over the deltoid.  Sensation is intact to light touch in both legs.   Psychiatric: Affect normal, Judgment normal, Mood normal.     DIFFERENTIAL DIAGNOSIS:  Neck strain, spinal fracture, ankle sprain, ankle fracture, mild traumatic brain injury, concussion, doubt skull fracture, doubt intracranial hemorrhage    RADIOLOGY/PROCEDURES  DX-ANKLE 3+ VIEWS LEFT   Final Result      Negative LEFT ankle series.      DX-CERVICAL SPINE-2 OR 3 VIEWS   Final Result      No cervical spine compression fracture or subluxation.      DX-LUMBAR SPINE-2 OR 3 VIEWS   Final Result      1.  Moderate multilevel degenerative change primarily involving thoracolumbar spine.   2.  No fracture or subluxation.   3.  Mild dextroconvex curvature.      DX-THORACIC SPINE-2 VIEWS   Final Result      1.  No thoracic spine compression fracture or subluxation.      2.  Unchanged minor upper thoracic scoliosis convex left.      Radiologist interpretation have been reviewed by me.     INTERVENTIONS:  None    ED COURSE:  Nursing notes, VS, PMSFHx reviewed in chart.     9:53 AM - Patient seen and examined at bedside. Based on our discussion, her primary complaint is not related to her head or brain. She is primarily concerned for her neck, back and ankle. Discussed her evaluation in the ED with radiology. Patient verbalizes understanding and agreement to  this plan of care. Ordered DX thoracic, DX lumbar, DX cervical, DX ankle to evaluate.     11:27 AM - Recheck: Patient re-evaluated at [facility]. Patient's diagnostic results discussed. Discussed patient's condition and treatment plan. Patient will be discharged with instructions and provided with strict return precautions. Advised to follow up with orthopedics. Instructed to return to Emergency Department immediately if any new or worsening symptoms.    MEDICAL DECISION MAKING:  Well-appearing patient presents 2 days after a fall with a reported head injury.  Given 48 hours post injury with no headache, nausea, vomiting, forgetfulness or confusion and no blood thinner use imaging unlikely to  and skull fracture intracranial hemorrhage are very unlikely.  She likely has a cervical spine thoracic spine and lumbar spine strain with underlying lumbar spine arthritis.  She has a very mild ankle sprain without evidence of fracture or dislocation.    PLAN:  Ibuprofen and Tylenol  Return for worsening headache, vomiting, confusion, concentration difficulties, forgetfulness    Cody Honeycutt M.D.  [address] [facility] [address]  [phone]    Schedule an appointment as soon as possible for a visit   As needed if not better 7-10 days    CONDITION: Stable.     FINAL IMPRESSION  1. Neck pain    2. Acute midline low back pain without sciatica    3. Sprain of left ankle, unspecified ligament, initial encounter    4. Mild traumatic brain injury, with loss of consciousness of 30 minutes or less, initial encounter ([facility])          Tevin GARCIA (Scribe), am scribing for, and in the presence of, Ronny Funk M.D..    Electronically signed by: Tevin Carrion (Kemiibcarlos), 3/19/2020    Ronny GARCIA M.D. personally performed the services described in this documentation, as scribed by Tevin Carrion in my presence, and it is both accurate and complete. C    The note accurately reflects work and decisions  made by me.  Ronny Funk M.D.  3/19/2020  12:22 PM

## 2020-03-19 NOTE — DISCHARGE INSTRUCTIONS
Take ibuprofen 600 mg up to 4 times a day for pain and add Tylenol for persistent pain.  Follow-up with your doctor or Ortho if not improving in 1 to 2 weeks.  Return for worsening headache, confusion, concentration difficulties, forgetfulness weakness or numbness.    You had a borderline or high normal blood pressure reading today.  This does not necessarily mean you have hypertension.  Please followup with your/a primary physician for comprehensive blood pressure evaluation and yearly fasting cholesterol assessment.  BP Readings from Last 3 Encounters:   03/19/20 (!) 186/104   03/07/20 148/89   03/03/20 120/90

## 2020-03-19 NOTE — ED TRIAGE NOTES
Chief Complaint   Patient presents with   • Neck Pain   • Back Pain   • Ankle Pain   • T-5000 FALL   Pt to triage in NAD.  Pt reports she fell down multiple stairs 2 days ago.  Pt denies LOC.  Pt denies taking anticoagulants or ASA.  Pt A&Ox4.

## 2020-03-23 ENCOUNTER — PATIENT OUTREACH (OUTPATIENT)
Dept: HEALTH INFORMATION MANAGEMENT | Facility: OTHER | Age: 60
End: 2020-03-23

## 2020-03-23 NOTE — PROGRESS NOTES
CHW made outgoing call to this patient to see if she would like to connect with [facility] services for assistance with follow-up care and any community resources she may benefit from. The patient's son answered and was able to communicate to Aubree for me and they are interested in CHW's assistance connecting to a new PCP here in [place].     The son stated that Aubree does have a PCP in [place] that she saw not too long ago, but they would like to switch to one in [place] so they don't have to travel too far for appointments. The son also mentioned that they are starting her with physical therapy, so they are covered in regards to orthopedic follow-up.    This worker will refer this patient to [facility] for primary care and any other services she may need. This worker will call out to the [facility]  to get a new patient appointment, and communicate with the patient. CHW was also able to send SMS to this patient with call back information if any other questions or needs come up in the meantime.

## 2020-03-24 ENCOUNTER — PATIENT OUTREACH (OUTPATIENT)
Dept: HEALTH INFORMATION MANAGEMENT | Facility: OTHER | Age: 60
End: 2020-03-24

## 2020-03-24 NOTE — PROGRESS NOTES
CHW made outgoing call to [facility] in [place] to establish this patient with a new PCP. She lives in [facility] and would like to establish a doctor closer to where she lives. This worker was able to speak to the  at [facility] and this patient is now established and scheduled with a new PCP at this clinic. Her new patient appointment is as follows...    Tuesday April 28, 2020   Check-in at 2:00  [address] [facility].     CHW was able to send an SMS message to this patient with appointment information, and CHW's contact information if any questions or needs come-up.

## 2020-03-26 ENCOUNTER — APPOINTMENT (OUTPATIENT)
Dept: RADIOLOGY | Facility: MEDICAL CENTER | Age: 60
End: 2020-03-26
Attending: EMERGENCY MEDICINE
Payer: MEDICAID

## 2020-03-26 ENCOUNTER — HOSPITAL ENCOUNTER (EMERGENCY)
Facility: MEDICAL CENTER | Age: 60
End: 2020-03-26
Attending: EMERGENCY MEDICINE
Payer: MEDICAID

## 2020-03-26 VITALS
BODY MASS INDEX: 33.91 KG/M2 | OXYGEN SATURATION: 97 % | HEIGHT: 64 IN | DIASTOLIC BLOOD PRESSURE: 89 MMHG | SYSTOLIC BLOOD PRESSURE: 175 MMHG | RESPIRATION RATE: 16 BRPM | HEART RATE: 88 BPM | WEIGHT: 198.63 LBS | TEMPERATURE: 96.8 F

## 2020-03-26 DIAGNOSIS — M25.531 RIGHT WRIST PAIN: ICD-10-CM

## 2020-03-26 DIAGNOSIS — R55 NEAR SYNCOPE: ICD-10-CM

## 2020-03-26 DIAGNOSIS — M25.562 ACUTE PAIN OF LEFT KNEE: ICD-10-CM

## 2020-03-26 DIAGNOSIS — M25.572 ACUTE LEFT ANKLE PAIN: ICD-10-CM

## 2020-03-26 LAB — EKG IMPRESSION: NORMAL

## 2020-03-26 PROCEDURE — 93005 ELECTROCARDIOGRAM TRACING: CPT | Performed by: EMERGENCY MEDICINE

## 2020-03-26 PROCEDURE — 73110 X-RAY EXAM OF WRIST: CPT | Mod: RT

## 2020-03-26 PROCEDURE — 73610 X-RAY EXAM OF ANKLE: CPT | Mod: LT

## 2020-03-26 PROCEDURE — 93005 ELECTROCARDIOGRAM TRACING: CPT

## 2020-03-26 PROCEDURE — 73564 X-RAY EXAM KNEE 4 OR MORE: CPT | Mod: LT

## 2020-03-26 PROCEDURE — 700102 HCHG RX REV CODE 250 W/ 637 OVERRIDE(OP): Performed by: EMERGENCY MEDICINE

## 2020-03-26 PROCEDURE — 99284 EMERGENCY DEPT VISIT MOD MDM: CPT

## 2020-03-26 PROCEDURE — A9270 NON-COVERED ITEM OR SERVICE: HCPCS | Performed by: EMERGENCY MEDICINE

## 2020-03-26 RX ORDER — IBUPROFEN 600 MG/1
600 TABLET ORAL ONCE
Status: COMPLETED | OUTPATIENT
Start: 2020-03-26 | End: 2020-03-26

## 2020-03-26 RX ADMIN — IBUPROFEN 600 MG: 600 TABLET ORAL at 10:15

## 2020-03-26 NOTE — ED PROVIDER NOTES
"ED Provider Note    Scribed for Doc Ospina M.D. by Tevin Carrion. 3/26/2020  9:32 AM    Primary care provider: Pcp Not In Computer  Means of arrival: walk in  History obtained from: patient  History limited by: none    CHIEF COMPLAINT  Chief Complaint   Patient presents with   • Syncope     patient \"got dizzy\" and fell yesterday, denies hitting head   • Arm Pain     right arm pain   • Leg Pain     left leg pain       HPI  Aubree Cantu is a 60 y.o. female who presents to the Emergency Department for evaluation status post syncopal episode sustained yesterday and complaining of right wrist, left knee, left ankle pain. She reports associated dizziness yesterday. Patient reports that she suddenly became dizzy yesterday just after getting out of bed. She states that she had a baseline ankle injury from a fall last week and was unable to maintain her balance causing the fall. Since the event, she states that her pain has increased, and is concerned about being able to go back to work, so she presents to the ED for evaluation and radiology scans. She reports a history of COPD, hypertension. Patient denies loss of consciousness, headache, numbness.      REVIEW OF SYSTEMS  Pertinent positives include wrist pain, knee pain, ankle pain, dizziness.   Pertinent negatives include no loss of consciousness, headache, numbness.    All other systems reviewed and negative. See HPI for further details.     PAST MEDICAL HISTORY   has a past medical history of Asthma, Cancer (HCC), Chronic obstructive pulmonary disease (HCC), Hypertension, and Psychiatric disorder.    SURGICAL HISTORY   has a past surgical history that includes other.    SOCIAL HISTORY  Social History     Tobacco Use   • Smoking status: Never Smoker   • Smokeless tobacco: Never Used   Substance Use Topics   • Alcohol use: Never     Frequency: Never   • Drug use: Never      Social History     Substance and Sexual Activity   Drug Use Never       FAMILY " "HISTORY  History reviewed. No pertinent family history.    CURRENT MEDICATIONS  Home Medications     Reviewed by Eula Bowie R.N. (Registered Nurse) on 03/26/20 at 0918  Med List Status: Partial   Medication Last Dose Status   albuterol 108 (90 Base) MCG/ACT Aero Soln inhalation aerosol  Active   benzonatate (TESSALON) 100 MG Cap  Active   betamethasone dipropionate (DIPROLENE) 0.05 % Cream  Active   gabapentin (NEURONTIN) 300 MG Cap  Active   hydrOXYzine HCl (ATARAX) 50 MG Tab  Active   ibuprofen (MOTRIN) 600 MG Tab  Active   montelukast (SINGULAIR) 10 MG Tab  Active   olanzapine (ZYPREXA) 15 MG tablet  Active   omeprazole (PRILOSEC) 10 MG CAPSULE DELAYED RELEASE  Active   Vilazodone HCl (VIIBRYD) 20 MG Tab  Active                ALLERGIES  Allergies   Allergen Reactions   • Xanax [Alprazolam Xr]      Increased anxiety       PHYSICAL EXAM  VITAL SIGNS: BP (!) 185/95   Pulse 96   Temp 36 °C (96.8 °F) (Temporal)   Resp 18   Ht 1.626 m (5' 4\")   Wt 90.1 kg (198 lb 10.2 oz)   LMP  (LMP Unknown)   SpO2 94%   BMI 34.10 kg/m²     Nursing note and vitals reviewed.  Constitutional: Well-developed and well-nourished. No distress.   HENT: Head is normocephalic and atraumatic. Oropharynx is clear and moist without exudate or erythema.   Eyes: Pupils are equal, round, and reactive to light. Conjunctiva are normal.   Cardiovascular: Normal rate and regular rhythm. No murmur heard. Normal radial pulses.  Pulmonary/Chest: Breath sounds normal. No wheezes or rales.   Abdominal: Soft and non-tender. No distention    Musculoskeletal: Extremities exhibit normal range of motion without edema or tenderness.   Neurological: Awake, alert and oriented to person, place, and time. No focal deficits noted.  Skin: Skin is warm and dry. No rash.   Psychiatric: Normal mood and affect. Appropriate for clinical situation.    DIAGNOSTIC STUDIES / PROCEDURES    EKG Interpretation  Interpreted by me as below    LABS  Results for orders " placed or performed during the hospital encounter of 20   EKG   Result Value Ref Range    Report       [facility] Emergency Dept.    Test Date:  2020  Pt Name:    CATARINO MILLER                Department: ER  MRN:        [id number]                      Room:  Gender:     Female                       Technician: [id number]  :        1960                   Requested By:ER TRIAGE PROTOCOL  Order #:    [id number]                    Reading MD: REJI DUTTA MD    Measurements  Intervals                                Axis  Rate:       84                           P:          75  DC:         168                          QRS:        -31  QRSD:       100                          T:          34  QT:         364  QTc:        431    Interpretive Statements  SINUS RHYTHM  LEFT AXIS DEVIATION  LATE PRECORDIAL R/S TRANSITION  No previous ECG available for comparison  Electronically Signed On 3- 9:32:44 PDT by REJI DUTTA MD     All labs reviewed by me.    RADIOLOGY  DX-WRIST-COMPLETE 3+ RIGHT   Final Result      1.  No evidence of acute fracture or dislocation.      2.  Old posttraumatic and surgical change of the distal radius and ulna.         DX-KNEE COMPLETE 4+ LEFT   Final Result      No evidence of acute fracture or dislocation.      Mild degenerative changes.      DX-ANKLE 3+ VIEWS LEFT   Final Result      No evidence of fracture or dislocation.      The radiologist's interpretation of all radiological studies have been reviewed by me.    COURSE & MEDICAL DECISION MAKING  Nursing notes, VS, PMSFHx reviewed in chart.     Review of past medical records shows the patient was here 1 week ago with similar symptoms. Radiology was negative.      9:32 AM - Patient seen and examined at bedside. She presents to the ED for radiology evaluation of the affected areas. Patient is requesting coffee at this time. Patient will be treated with Motrin 600 mg. Ordered DX wrist, DX ankle, DX knee  "to evaluate her symptoms. The differential diagnoses include but are not limited to: fracture vs sprain     11:09 AM - Patient's diagnostic results discussed. Discussed patient's condition and treatment plan. Patient will be discharged with instructions and provided with strict return precautions. Advised to follow up with her primary. Instructed to return to Emergency Department immediately if any new or worsening symptoms.    Discharge vitals: BP (!) 175/89   Pulse 88   Temp 36 °C (96.8 °F) (Temporal)   Resp 16   Ht 1.626 m (5' 4\")   Wt 90.1 kg (198 lb 10.2 oz)   LMP  (LMP Unknown)   SpO2 97%   BMI 34.10 kg/m²     The patient will return for new or worsening symptoms and is stable at the time of discharge.    The patient is referred to a primary physician for blood pressure management, diabetic screening, and for all other preventative health concerns.    DISPOSITION:  Patient will be discharged home in stable condition.    FOLLOW UP:  [facility], Emergency Dept  [address] [place] [id number] [phone]    If symptoms worsen      OUTPATIENT MEDICATIONS:  Discharge Medication List as of 3/26/2020 10:54 AM        FINAL IMPRESSION  1. Near syncope    2. Acute left ankle pain    3. Acute pain of left knee    4. Right wrist pain          Tevin GARCIA (Kemiibcarlos), am scribing for, and in the presence of, Doc Ospina M.D..    Electronically signed by: Tevin Carrion (Scribe), 3/26/2020    IDoc M.D. personally performed the services described in this documentation, as scribed by Tevin Carrion in my presence, and it is both accurate and complete. C    The note accurately reflects work and decisions made by me.  Doc Ospina M.D.  3/26/2020  12:09 PM    "

## 2020-03-26 NOTE — ED NOTES
Pt states she had a controlled fall yesterday afternoon.  Has been alert and oriented since that time and is concerned about a pain to her left knee and left ankle.  Pt believes she may have twisted them.  Pt ambulated to room without difficulty and uses a cane all the time for assistance.

## 2020-03-26 NOTE — ED TRIAGE NOTES
".  Chief Complaint   Patient presents with   • Syncope     patient \"got dizzy\" and fell yesterday, denies hitting head   • Arm Pain     right arm pain   • Leg Pain     left leg pain     .BP (!) 185/95   Pulse 96   Temp 36 °C (96.8 °F) (Temporal)   Resp 18   Ht 1.626 m (5' 4\")   Wt 90.1 kg (198 lb 10.2 oz)   LMP  (LMP Unknown)   SpO2 94%   BMI 34.10 kg/m²     Ambulatory to triage with above complaint, patient denies recent travel, denies respiratory complaints, called for EKG  "

## 2021-06-18 ENCOUNTER — APPOINTMENT (OUTPATIENT)
Dept: RADIOLOGY | Facility: IMAGING CENTER | Age: 61
End: 2021-06-18
Attending: NURSE PRACTITIONER
Payer: MEDICAID

## 2021-06-18 ENCOUNTER — OFFICE VISIT (OUTPATIENT)
Dept: URGENT CARE | Facility: CLINIC | Age: 61
End: 2021-06-18

## 2021-06-18 VITALS
TEMPERATURE: 97.6 F | HEIGHT: 65 IN | SYSTOLIC BLOOD PRESSURE: 112 MMHG | RESPIRATION RATE: 16 BRPM | BODY MASS INDEX: 32.61 KG/M2 | OXYGEN SATURATION: 94 % | WEIGHT: 195.7 LBS | DIASTOLIC BLOOD PRESSURE: 84 MMHG | HEART RATE: 75 BPM

## 2021-06-18 DIAGNOSIS — S99.921A FOOT INJURY, RIGHT, INITIAL ENCOUNTER: ICD-10-CM

## 2021-06-18 DIAGNOSIS — H65.92 FLUID LEVEL BEHIND TYMPANIC MEMBRANE OF LEFT EAR: ICD-10-CM

## 2021-06-18 DIAGNOSIS — H92.02 ACUTE EAR PAIN, LEFT: ICD-10-CM

## 2021-06-18 DIAGNOSIS — Z88.9 H/O SEASONAL ALLERGIES: ICD-10-CM

## 2021-06-18 PROCEDURE — 99203 OFFICE O/P NEW LOW 30 MIN: CPT | Performed by: NURSE PRACTITIONER

## 2021-06-18 RX ORDER — FLUTICASONE PROPIONATE 50 MCG
2 SPRAY, SUSPENSION (ML) NASAL DAILY
Qty: 9.9 ML | Refills: 0 | Status: SHIPPED | OUTPATIENT
Start: 2021-06-18

## 2021-06-18 ASSESSMENT — ENCOUNTER SYMPTOMS
TINGLING: 0
CHILLS: 0
WEAKNESS: 0
FALLS: 0
BRUISES/BLEEDS EASILY: 0
MYALGIAS: 1
FEVER: 0
SENSORY CHANGE: 0

## 2021-06-18 NOTE — PROGRESS NOTES
Subjective:      Aubree Cantu is a 61 y.o. female who presents with Foot Injury ((R) foot x 2 days; tripped over coffee table ) and Ear Pain ((L) ear x 3 days )            HPI  States tripped over coffee table last night, pain to dorsal aspect of right foot. No noted numbness/tingling. Painful with weight bearing. States history of osteoporosis.     States acute left ear pain last night. Sharp pain with laying down. No noted discharge, blood. History of ear wax buildup. Muffled hearing.     PMH:  has a past medical history of Asthma, Cancer (HCC), Chronic obstructive pulmonary disease (HCC), Hypertension, and Psychiatric disorder. She also has no past medical history of Diabetes (HCC) or Liver disease.  MEDS:   Current Outpatient Medications:   •  ibuprofen (MOTRIN) 600 MG Tab, Take 1 Tab by mouth every 6 hours as needed for Moderate Pain or Inflammation., Disp: 20 Tab, Rfl: 0  •  albuterol 108 (90 Base) MCG/ACT Aero Soln inhalation aerosol, Inhale 2 Puffs by mouth every 6 hours as needed for Shortness of Breath., Disp: 8.5 g, Rfl: 1  •  betamethasone dipropionate (DIPROLENE) 0.05 % Cream, Apply 1 Application to affected area(s) 2 times a day. Apply to legs and feet, Disp: 1 Tube, Rfl: 0  •  gabapentin (NEURONTIN) 300 MG Cap, Take 1 Cap by mouth 3 times a day., Disp: 90 Cap, Rfl: 0  •  hydrOXYzine HCl (ATARAX) 50 MG Tab, Take 1 Tab by mouth 3 times a day. Indications: Feeling Anxious, Disp: 90 Tab, Rfl: 0  •  montelukast (SINGULAIR) 10 MG Tab, Take 1 Tab by mouth every day., Disp: 30 Tab, Rfl: 0  •  olanzapine (ZYPREXA) 15 MG tablet, Take 1 Tab by mouth every bedtime., Disp: 30 Tab, Rfl: 0  •  benzonatate (TESSALON) 100 MG Cap, Take 1 Cap by mouth 3 times a day as needed for Cough. (Patient not taking: Reported on 6/18/2021), Disp: 20 Cap, Rfl: 0  •  omeprazole (PRILOSEC) 10 MG CAPSULE DELAYED RELEASE, Take 1 Cap by mouth every morning. (Patient not taking: Reported on 6/18/2021), Disp: 30 Cap, Rfl: 0  •   "Vilazodone HCl (VIIBRYD) 20 MG Tab, Take 20 mg by mouth every morning. (Patient not taking: Reported on 6/18/2021), Disp: 7 Tab, Rfl: 0  ALLERGIES:   Allergies   Allergen Reactions   • Xanax [Alprazolam Xr]      Increased anxiety     SURGHX:   Past Surgical History:   Procedure Laterality Date   • OTHER      Parathyroidectomy     SOCHX:  reports that she has never smoked. She has never used smokeless tobacco. She reports that she does not drink alcohol and does not use drugs.  FH: Family history was reviewed, no pertinent findings to report    Review of Systems   Constitutional: Negative for chills, fever and malaise/fatigue.   Cardiovascular: Negative for leg swelling.   Musculoskeletal: Positive for joint pain and myalgias. Negative for falls.   Skin: Negative for itching and rash.   Neurological: Negative for tingling, sensory change and weakness.   Endo/Heme/Allergies: Does not bruise/bleed easily.   All other systems reviewed and are negative.         Objective:     /84 (BP Location: Right arm, Patient Position: Sitting)   Pulse 75   Temp 36.4 °C (97.6 °F) (Temporal)   Resp 16   Ht 1.638 m (5' 4.5\")   Wt 88.8 kg (195 lb 11.2 oz)   LMP  (LMP Unknown)   SpO2 94%   BMI 33.07 kg/m²      Physical Exam  Vitals reviewed.   Constitutional:       General: She is awake. She is not in acute distress.     Appearance: She is well-developed. She is not ill-appearing, toxic-appearing or diaphoretic.   HENT:      Head: Normocephalic.      Right Ear: Hearing, tympanic membrane, ear canal and external ear normal.      Left Ear: Ear canal and external ear normal. A middle ear effusion is present.      Nose: Nose normal.   Eyes:      Pupils: Pupils are equal, round, and reactive to light.   Cardiovascular:      Rate and Rhythm: Normal rate.   Pulmonary:      Effort: Pulmonary effort is normal.   Musculoskeletal:      Right foot: Normal range of motion and normal capillary refill. Tenderness and bony tenderness " present. No swelling, deformity, bunion, Charcot foot, foot drop, prominent metatarsal heads, laceration or crepitus. Normal pulse.        Feet:    Feet:      Right foot:      Protective Sensation: 10 sites tested. 10 sites sensed.      Skin integrity: Skin integrity normal.   Skin:     General: Skin is warm and dry.      Coloration: Skin is not ashen, cyanotic, jaundiced, mottled, pale or sallow.      Findings: No abrasion, bruising, burn, ecchymosis, erythema, signs of injury, laceration, petechiae, rash or wound.   Neurological:      Mental Status: She is alert and oriented to person, place, and time.      GCS: GCS eye subscore is 4. GCS verbal subscore is 5. GCS motor subscore is 6.   Psychiatric:         Attention and Perception: Attention and perception normal.         Mood and Affect: Mood and affect normal.         Speech: Speech normal.         Behavior: Behavior normal. Behavior is cooperative.         Thought Content: Thought content normal.         Cognition and Memory: Cognition and memory normal.         Judgment: Judgment normal.                        Assessment/Plan:        1. Foot injury, right, initial encounter    - DX-FOOT-COMPLETE 3+ RIGHT; Future    2. Acute ear pain, left    - fluticasone (FLONASE) 50 MCG/ACT nasal spray; Administer 2 Sprays into affected nostril(S) every day.  Dispense: 9.9 mL; Refill: 0    3. Fluid level behind tympanic membrane of left ear    - fluticasone (FLONASE) 50 MCG/ACT nasal spray; Administer 2 Sprays into affected nostril(S) every day.  Dispense: 9.9 mL; Refill: 0    4. H/O seasonal allergies    - fluticasone (FLONASE) 50 MCG/ACT nasal spray; Administer 2 Sprays into affected nostril(S) every day.  Dispense: 9.9 mL; Refill: 0    Soft tissue injury with fracture or contusion indicated. Recommend the following:  -May use over the counter NSAID as needed for pain/swelling  -May use cool compresses for swelling as needed  -May utilize RICE method as needed, may ace  wrap as needed   -Avoid excessive weight bearing to avoid further injury  -May apply topical analgesics as needed   -Perform proper body mechanics with lifting, twisting, bending and walking  -Monitor for deformity, numbness/tingling in toes, decreased range of motion with weight bearing- need re-evaluation    Seasonal allergies probable for acute left ear pain.   Recommend the following:  -Increase water intake  -Get rest  -May take longer acting antihistamine for seasonal allergy symptoms (chose one like Claritin/Zyrtec/Allegra without decongestant) x 1 week then as needed   -May use over the counter saline nasal spray for nasal congestion up to 4x/day  -May use over the counter Flonase or Nasocort for allergen nasal congestion as needed x 1 week then as needed   -Monitor for worsening or persistent symptoms, increased facial pressure, dizziness, fever, productive cough, shortness of breath and chest pain/tightness- need re-evaluation

## 2021-11-01 ENCOUNTER — OFFICE VISIT (OUTPATIENT)
Dept: BEHAVIORAL HEALTH | Facility: PSYCHIATRIC FACILITY | Age: 61
End: 2021-11-01
Payer: MEDICAID

## 2021-11-01 VITALS — WEIGHT: 188.2 LBS

## 2021-11-01 DIAGNOSIS — F41.1 GENERALIZED ANXIETY DISORDER: ICD-10-CM

## 2021-11-01 DIAGNOSIS — F25.8 OTHER SCHIZOAFFECTIVE DISORDERS (HCC): ICD-10-CM

## 2021-11-01 PROBLEM — F25.1 SCHIZOAFFECTIVE DISORDER, DEPRESSIVE TYPE (HCC): Status: ACTIVE | Noted: 2021-11-01

## 2021-11-01 PROBLEM — Z79.899 ENCOUNTER FOR LONG-TERM (CURRENT) USE OF MEDICATIONS: Status: ACTIVE | Noted: 2021-11-01

## 2021-11-01 PROCEDURE — 99213 OFFICE O/P EST LOW 20 MIN: CPT | Mod: GC | Performed by: STUDENT IN AN ORGANIZED HEALTH CARE EDUCATION/TRAINING PROGRAM

## 2021-11-01 RX ORDER — VILAZODONE HYDROCHLORIDE 20 MG/1
20 TABLET ORAL DAILY
Qty: 30 TABLET | Refills: 0 | Status: SHIPPED | OUTPATIENT
Start: 2021-11-01 | End: 2021-12-27 | Stop reason: SDUPTHER

## 2021-11-01 RX ORDER — OLANZAPINE 10 MG/1
10 TABLET ORAL
Qty: 30 TABLET | Refills: 0 | Status: SHIPPED | OUTPATIENT
Start: 2021-11-01 | End: 2021-12-06

## 2021-11-01 RX ORDER — VILAZODONE HYDROCHLORIDE 20 MG/1
20 TABLET ORAL DAILY
COMMUNITY
Start: 2021-10-17 | End: 2021-11-01 | Stop reason: SDUPTHER

## 2021-11-01 ASSESSMENT — ENCOUNTER SYMPTOMS
DEPRESSION: 0
VOMITING: 0
NAUSEA: 0
ABDOMINAL PAIN: 0
HALLUCINATIONS: 0
SEIZURES: 0
PALPITATIONS: 0
FEVER: 0
MYALGIAS: 1
CONSTIPATION: 0
SHORTNESS OF BREATH: 0
DIZZINESS: 0
NERVOUS/ANXIOUS: 1
HEADACHES: 0
FALLS: 0
DIARRHEA: 0
CHILLS: 0
COUGH: 0
LOSS OF CONSCIOUSNESS: 0
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
## 2021-12-01 DIAGNOSIS — F25.8 OTHER SCHIZOAFFECTIVE DISORDERS (HCC): ICD-10-CM

## 2021-12-06 RX ORDER — OLANZAPINE 10 MG/1
TABLET ORAL
Qty: 30 TABLET | Refills: 0 | Status: SHIPPED | OUTPATIENT
Start: 2021-12-06 | End: 2021-12-13

## 2021-12-13 DIAGNOSIS — F25.8 OTHER SCHIZOAFFECTIVE DISORDERS (HCC): ICD-10-CM

## 2021-12-13 RX ORDER — OLANZAPINE 10 MG/1
TABLET ORAL
Qty: 30 TABLET | Refills: 0 | Status: SHIPPED | OUTPATIENT
Start: 2021-12-13 | End: 2021-12-27 | Stop reason: SDUPTHER

## 2021-12-27 ENCOUNTER — OFFICE VISIT (OUTPATIENT)
Dept: BEHAVIORAL HEALTH | Facility: PSYCHIATRIC FACILITY | Age: 61
End: 2021-12-27
Payer: MEDICAID

## 2021-12-27 VITALS — BODY MASS INDEX: 33.02 KG/M2 | WEIGHT: 195.4 LBS

## 2021-12-27 DIAGNOSIS — F25.8 OTHER SCHIZOAFFECTIVE DISORDERS (HCC): ICD-10-CM

## 2021-12-27 DIAGNOSIS — F41.1 GENERALIZED ANXIETY DISORDER: ICD-10-CM

## 2021-12-27 PROCEDURE — 99213 OFFICE O/P EST LOW 20 MIN: CPT | Mod: GE | Performed by: STUDENT IN AN ORGANIZED HEALTH CARE EDUCATION/TRAINING PROGRAM

## 2021-12-27 RX ORDER — VILAZODONE HYDROCHLORIDE 20 MG/1
20 TABLET ORAL DAILY
Qty: 30 TABLET | Refills: 1 | Status: SHIPPED | OUTPATIENT
Start: 2021-12-27 | End: 2022-03-15 | Stop reason: SDUPTHER

## 2021-12-27 RX ORDER — OLANZAPINE 10 MG/1
10 TABLET ORAL
Qty: 30 TABLET | Refills: 1 | Status: SHIPPED | OUTPATIENT
Start: 2021-12-27 | End: 2022-03-15 | Stop reason: SDUPTHER

## 2021-12-27 ASSESSMENT — ENCOUNTER SYMPTOMS
COUGH: 0
DEPRESSION: 0
CHILLS: 0
ABDOMINAL PAIN: 0
VOMITING: 0
SHORTNESS OF BREATH: 0
FALLS: 0
DIZZINESS: 0
HALLUCINATIONS: 0
PALPITATIONS: 0
NAUSEA: 0
CONSTIPATION: 0
LOSS OF CONSCIOUSNESS: 0
SEIZURES: 0
FEVER: 0
NERVOUS/ANXIOUS: 1
HEADACHES: 0
DIARRHEA: 0
MYALGIAS: 1
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
## 2022-01-31 ENCOUNTER — OFFICE VISIT (OUTPATIENT)
Dept: BEHAVIORAL HEALTH | Facility: PSYCHIATRIC FACILITY | Age: 62
End: 2022-01-31
Payer: MEDICAID

## 2022-01-31 VITALS — WEIGHT: 193.6 LBS | BODY MASS INDEX: 32.72 KG/M2

## 2022-01-31 DIAGNOSIS — F25.8 OTHER SCHIZOAFFECTIVE DISORDERS (HCC): ICD-10-CM

## 2022-01-31 DIAGNOSIS — F41.1 GENERALIZED ANXIETY DISORDER: ICD-10-CM

## 2022-01-31 PROCEDURE — 99214 OFFICE O/P EST MOD 30 MIN: CPT | Mod: GC | Performed by: STUDENT IN AN ORGANIZED HEALTH CARE EDUCATION/TRAINING PROGRAM

## 2022-01-31 ASSESSMENT — ENCOUNTER SYMPTOMS
FEVER: 0
VOMITING: 0
ABDOMINAL PAIN: 0
NAUSEA: 0
DIARRHEA: 0
SEIZURES: 0
HALLUCINATIONS: 0
HEADACHES: 0
PALPITATIONS: 0
DIZZINESS: 0
CHILLS: 0
DEPRESSION: 0
CONSTIPATION: 0
COUGH: 0
SHORTNESS OF BREATH: 0
LOSS OF CONSCIOUSNESS: 0
FALLS: 0
MYALGIAS: 1
NERVOUS/ANXIOUS: 1

## 2022-01-31 ASSESSMENT — LIFESTYLE VARIABLES
HOW OFTEN DO YOU HAVE A DRINK CONTAINING ALCOHOL: NEVER
HOW OFTEN DO YOU HAVE SIX OR MORE DRINKS ON ONE OCCASION: NEVER
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
## 2022-03-01 ENCOUNTER — TELEPHONE (OUTPATIENT)
Dept: BEHAVIORAL HEALTH | Facility: PSYCHIATRIC FACILITY | Age: 62
End: 2022-03-01
Payer: MEDICAID

## 2022-03-01 ENCOUNTER — APPOINTMENT (OUTPATIENT)
Dept: BEHAVIORAL HEALTH | Facility: PSYCHIATRIC FACILITY | Age: 62
End: 2022-03-01
Payer: MEDICAID

## 2022-03-01 NOTE — TELEPHONE ENCOUNTER
Received message that pt needed to cancel appointment and wanted to talk to doctor.     I attempted to call patient twice at both numbers listed. No response received. Voicemail was not set up.    I attempted to call her friend, who provides transport to each appointment, as he had also called the office at ([phone]). No answer.    Patient relies on friend for transportation to appointments from [place]. Friend works throughout the night and his scheduled sleep time is during [facility] afternoon clinic hours. Thus, afternoon appointments, are difficult to maintain. However, for the most part pt shows to most appointments.    It is important that patient receives routine care. Because of the strain on her friend for transportation I have provided resources for psychiatric care in [place] that would alleviate the dependence on her friend for transportation. Pt has been resistant to change providers to this point.

## 2022-03-15 ENCOUNTER — TELEPHONE (OUTPATIENT)
Dept: BEHAVIORAL HEALTH | Facility: PSYCHIATRIC FACILITY | Age: 62
End: 2022-03-15
Payer: MEDICAID

## 2022-03-15 DIAGNOSIS — F25.8 OTHER SCHIZOAFFECTIVE DISORDERS (HCC): ICD-10-CM

## 2022-03-15 DIAGNOSIS — F41.1 GENERALIZED ANXIETY DISORDER: ICD-10-CM

## 2022-03-15 RX ORDER — OLANZAPINE 10 MG/1
10 TABLET ORAL
Qty: 30 TABLET | Refills: 2 | Status: SHIPPED | OUTPATIENT
Start: 2022-03-15

## 2022-03-15 RX ORDER — VILAZODONE HYDROCHLORIDE 20 MG/1
20 TABLET ORAL DAILY
Qty: 30 TABLET | Refills: 2 | Status: SHIPPED | OUTPATIENT
Start: 2022-03-15

## 2022-03-15 NOTE — TELEPHONE ENCOUNTER
"Pt called early this morning through the service @ 2092, and is requesting a call back from her \"\"   "

## 2022-03-15 NOTE — TELEPHONE ENCOUNTER
Received message that pt called requesting to speak with her doctor. I returned patient call at number listed in chart. I was able to contact patient.    Patient expressed that she cannot make her appointment on 3/28/22 due to her friend not being able to drive her. She was concerned with medications and their refills. I assured patient that I would refill medication and send to pharmacy. I also assured patient that I would have  reach out to reschedule appointment for available time in April.    Because patient is dependent on friend for transportation and afternoon appointments are difficult for him we have discussed transition of care to alternative providers, with morning appointments. Patient has been resistant to transfer. Although uncertain at this point in time, my schedule may change in July and I may be able to schedule morning appointments. If this occurs continued patient care in this clinic would be appropriate, however, if patient cannot be seen regularly in this clinic due to scheduling and transportation issues patient would be better served by transferring care to alternative provider that can better accommodate her friend's transportation schedule.

## 2022-03-29 ENCOUNTER — TELEPHONE (OUTPATIENT)
Dept: BEHAVIORAL HEALTH | Facility: PSYCHIATRIC FACILITY | Age: 62
End: 2022-03-29
Payer: MEDICAID

## 2022-03-29 NOTE — TELEPHONE ENCOUNTER
Called pt at number listed. Pt answered. Pt is concerned she will no longer be able to make our appointments. Her friend who provides transportation cannot transport in the afternoons.     Reassured patient that I would refill medications as necessary until she can establish with other provider. She is currently on a waiting list.    Pt is currently stable, is able to attend to her needs and can advocate for herself. If she able to be seen here I am happy to see her, otherwise will plan for transition of care.    ----- Message from Ange Soriano sent at 3/29/2022  1:33 PM PDT -----  Regarding: Phone call  Patient called she wont be able to make appointments anymore due to her friend not wanting to take her or talk to her. She asked if you can help her and give her a call. [phone]

## 2022-04-25 ENCOUNTER — TELEPHONE (OUTPATIENT)
Dept: BEHAVIORAL HEALTH | Facility: PSYCHIATRIC FACILITY | Age: 62
End: 2022-04-25
Payer: MEDICAID

## 2022-04-25 NOTE — TELEPHONE ENCOUNTER
Attempted to reach patient by phone. [phone] number, no answer, and voicemail was not set up.     Attempted [phone] number and there was also no answer. Voicemail answering service indicated that phone number belonged to patient's friend, Ed, thus did not leave message as not owned by patient.

## 2023-12-15 NOTE — ED NOTES
Patient and son walked around corner towards the restroom, did not return, confirmed they left via the front door. We have called them and they are returning for the discharge instructions and prescriptions for singular and vilazodone.     17